# Patient Record
Sex: MALE | Race: WHITE | NOT HISPANIC OR LATINO
[De-identification: names, ages, dates, MRNs, and addresses within clinical notes are randomized per-mention and may not be internally consistent; named-entity substitution may affect disease eponyms.]

---

## 2021-12-10 ENCOUNTER — APPOINTMENT (OUTPATIENT)
Dept: UROLOGY | Facility: CLINIC | Age: 51
End: 2021-12-10
Payer: COMMERCIAL

## 2021-12-10 DIAGNOSIS — Z78.9 OTHER SPECIFIED HEALTH STATUS: ICD-10-CM

## 2021-12-10 DIAGNOSIS — F52.9 UNSPECIFIED SEXUAL DYSFUNCTION NOT DUE TO A SUBSTANCE OR KNOWN PHYSIOLOGICAL CONDITION: ICD-10-CM

## 2021-12-10 PROBLEM — Z00.00 ENCOUNTER FOR PREVENTIVE HEALTH EXAMINATION: Status: ACTIVE | Noted: 2021-12-10

## 2021-12-10 PROCEDURE — 99203 OFFICE O/P NEW LOW 30 MIN: CPT

## 2021-12-13 PROBLEM — F52.9 SEXUAL DISORDER: Status: ACTIVE | Noted: 2021-12-13

## 2021-12-13 PROBLEM — Z78.9 NO PERTINENT PAST MEDICAL HISTORY: Status: RESOLVED | Noted: 2021-12-13 | Resolved: 2021-12-13

## 2021-12-13 NOTE — ASSESSMENT
[FreeTextEntry1] : reviewed physiology and pathophysiology of erectile function.\par noted he has no risk factors and seems issues more psychological in nature given can perform other times without issue and he is now dwelling upon performance.\par discussed use of oral medications both positives and negatives - will stay off for now\par discussed use of a penis ring - will try

## 2021-12-13 NOTE — HISTORY OF PRESENT ILLNESS
[FreeTextEntry1] : here for management of ED\par he notes intermittent episodes o difficulty with erections. SOmetimes finds it not rigid enough for penetration or other times loses rigidity too quickly. This occurs intermittently but he is now "thinking" about it and can avoid intimacy to some degree because of it. Other times he performs without incident and has no issues with erection quality when masturbates. \par his PCP gave him low dose Cialis which he felt helped but doesn't want to be dependent on it. \par has no medical risk factors for ED - never smoked. \par